# Patient Record
Sex: FEMALE | Race: WHITE | NOT HISPANIC OR LATINO | Employment: OTHER | URBAN - METROPOLITAN AREA
[De-identification: names, ages, dates, MRNs, and addresses within clinical notes are randomized per-mention and may not be internally consistent; named-entity substitution may affect disease eponyms.]

---

## 2017-03-01 ENCOUNTER — HOSPITAL ENCOUNTER (EMERGENCY)
Facility: HOSPITAL | Age: 62
Discharge: PSYCHIATRIC OR SUBSTANCE USE FACILITY | End: 2017-03-01
Attending: SURGERY
Payer: MEDICARE

## 2017-03-01 ENCOUNTER — HOSPITAL ENCOUNTER (INPATIENT)
Facility: HOSPITAL | Age: 62
LOS: 2 days | Discharge: HOME | DRG: 882 | End: 2017-03-03
Attending: PSYCHIATRY & NEUROLOGY | Admitting: PSYCHIATRY & NEUROLOGY
Payer: MEDICARE

## 2017-03-01 VITALS
HEART RATE: 87 BPM | DIASTOLIC BLOOD PRESSURE: 75 MMHG | RESPIRATION RATE: 20 BRPM | TEMPERATURE: 99 F | SYSTOLIC BLOOD PRESSURE: 150 MMHG | OXYGEN SATURATION: 99 %

## 2017-03-01 DIAGNOSIS — I10 ESSENTIAL HYPERTENSION: ICD-10-CM

## 2017-03-01 DIAGNOSIS — F32.A DEPRESSION WITH SUICIDAL IDEATION: ICD-10-CM

## 2017-03-01 DIAGNOSIS — F32.A DEPRESSION, UNSPECIFIED DEPRESSION TYPE: Primary | ICD-10-CM

## 2017-03-01 DIAGNOSIS — R45.851 DEPRESSION WITH SUICIDAL IDEATION: ICD-10-CM

## 2017-03-01 DIAGNOSIS — M54.50 CHRONIC BILATERAL LOW BACK PAIN WITHOUT SCIATICA: ICD-10-CM

## 2017-03-01 DIAGNOSIS — F43.25 ADJUSTMENT DISORDER WITH MIXED DISTURBANCE OF EMOTIONS AND CONDUCT: Primary | ICD-10-CM

## 2017-03-01 DIAGNOSIS — E78.00 PURE HYPERCHOLESTEROLEMIA: ICD-10-CM

## 2017-03-01 DIAGNOSIS — R45.851 SUICIDAL IDEATION: ICD-10-CM

## 2017-03-01 DIAGNOSIS — G89.29 CHRONIC BILATERAL LOW BACK PAIN WITHOUT SCIATICA: ICD-10-CM

## 2017-03-01 LAB
ALBUMIN SERPL BCP-MCNC: 4.2 G/DL
ALP SERPL-CCNC: 66 U/L
ALT SERPL W/O P-5'-P-CCNC: 13 U/L
AMPHET+METHAMPHET UR QL: NEGATIVE
ANION GAP SERPL CALC-SCNC: 8 MMOL/L
APAP SERPL-MCNC: <3 UG/ML
AST SERPL-CCNC: 16 U/L
BACTERIA #/AREA URNS HPF: NORMAL /HPF
BARBITURATES UR QL SCN>200 NG/ML: NEGATIVE
BASOPHILS # BLD AUTO: 0.03 K/UL
BASOPHILS NFR BLD: 0.4 %
BENZODIAZ UR QL SCN>200 NG/ML: NORMAL
BILIRUB SERPL-MCNC: 0.5 MG/DL
BILIRUB UR QL STRIP: NEGATIVE
BUN SERPL-MCNC: 9 MG/DL
BZE UR QL SCN: NEGATIVE
CALCIUM SERPL-MCNC: 10 MG/DL
CANNABINOIDS UR QL SCN: NEGATIVE
CHLORIDE SERPL-SCNC: 110 MMOL/L
CLARITY UR: CLEAR
CO2 SERPL-SCNC: 29 MMOL/L
COLOR UR: YELLOW
CREAT SERPL-MCNC: 0.8 MG/DL
CREAT UR-MCNC: 206.8 MG/DL
DIFFERENTIAL METHOD: ABNORMAL
EOSINOPHIL # BLD AUTO: 0.2 K/UL
EOSINOPHIL NFR BLD: 2.3 %
ERYTHROCYTE [DISTWIDTH] IN BLOOD BY AUTOMATED COUNT: 14.2 %
EST. GFR  (AFRICAN AMERICAN): >60 ML/MIN/1.73 M^2
EST. GFR  (NON AFRICAN AMERICAN): >60 ML/MIN/1.73 M^2
ETHANOL SERPL-MCNC: <10 MG/DL
GLUCOSE SERPL-MCNC: 100 MG/DL
GLUCOSE UR QL STRIP: NEGATIVE
HCT VFR BLD AUTO: 37.9 %
HGB BLD-MCNC: 12.8 G/DL
HGB UR QL STRIP: ABNORMAL
HYALINE CASTS #/AREA URNS LPF: 0 /LPF
KETONES UR QL STRIP: ABNORMAL
LEUKOCYTE ESTERASE UR QL STRIP: NEGATIVE
LYMPHOCYTES # BLD AUTO: 2.3 K/UL
LYMPHOCYTES NFR BLD: 30.2 %
MCH RBC QN AUTO: 29.8 PG
MCHC RBC AUTO-ENTMCNC: 33.8 %
MCV RBC AUTO: 88 FL
METHADONE UR QL SCN>300 NG/ML: NEGATIVE
MICROSCOPIC COMMENT: NORMAL
MONOCYTES # BLD AUTO: 0.5 K/UL
MONOCYTES NFR BLD: 7 %
NEUTROPHILS # BLD AUTO: 4.6 K/UL
NEUTROPHILS NFR BLD: 60.1 %
NITRITE UR QL STRIP: NEGATIVE
OPIATES UR QL SCN: NEGATIVE
PCP UR QL SCN>25 NG/ML: NEGATIVE
PH UR STRIP: >8 [PH] (ref 5–8)
PLATELET # BLD AUTO: 197 K/UL
PMV BLD AUTO: 8.5 FL
POTASSIUM SERPL-SCNC: 3.9 MMOL/L
PROT SERPL-MCNC: 7.3 G/DL
PROT UR QL STRIP: ABNORMAL
RBC # BLD AUTO: 4.3 M/UL
RBC #/AREA URNS HPF: 1 /HPF (ref 0–4)
SALICYLATES SERPL-MCNC: <5 MG/DL
SODIUM SERPL-SCNC: 147 MMOL/L
SP GR UR STRIP: 1.02 (ref 1–1.03)
T4 FREE SERPL-MCNC: 0.92 NG/DL
TOXICOLOGY INFORMATION: NORMAL
TSH SERPL DL<=0.005 MIU/L-ACNC: 1.93 UIU/ML
URN SPEC COLLECT METH UR: ABNORMAL
UROBILINOGEN UR STRIP-ACNC: ABNORMAL EU/DL
WBC # BLD AUTO: 7.71 K/UL
WBC #/AREA URNS HPF: 1 /HPF (ref 0–5)

## 2017-03-01 PROCEDURE — 86592 SYPHILIS TEST NON-TREP QUAL: CPT

## 2017-03-01 PROCEDURE — 11400000 HC PSYCH PRIVATE ROOM

## 2017-03-01 PROCEDURE — 36415 COLL VENOUS BLD VENIPUNCTURE: CPT

## 2017-03-01 PROCEDURE — 96372 THER/PROPH/DIAG INJ SC/IM: CPT

## 2017-03-01 PROCEDURE — 84481 FREE ASSAY (FT-3): CPT

## 2017-03-01 PROCEDURE — 84443 ASSAY THYROID STIM HORMONE: CPT

## 2017-03-01 PROCEDURE — 81000 URINALYSIS NONAUTO W/SCOPE: CPT

## 2017-03-01 PROCEDURE — 63600175 PHARM REV CODE 636 W HCPCS: Performed by: SURGERY

## 2017-03-01 PROCEDURE — 80053 COMPREHEN METABOLIC PANEL: CPT

## 2017-03-01 PROCEDURE — 82306 VITAMIN D 25 HYDROXY: CPT

## 2017-03-01 PROCEDURE — 85025 COMPLETE CBC W/AUTO DIFF WBC: CPT

## 2017-03-01 PROCEDURE — 99285 EMERGENCY DEPT VISIT HI MDM: CPT | Mod: 25

## 2017-03-01 PROCEDURE — 84439 ASSAY OF FREE THYROXINE: CPT

## 2017-03-01 PROCEDURE — 80320 DRUG SCREEN QUANTALCOHOLS: CPT

## 2017-03-01 PROCEDURE — 80329 ANALGESICS NON-OPIOID 1 OR 2: CPT

## 2017-03-01 PROCEDURE — 80061 LIPID PANEL: CPT

## 2017-03-01 PROCEDURE — 82746 ASSAY OF FOLIC ACID SERUM: CPT

## 2017-03-01 PROCEDURE — 80307 DRUG TEST PRSMV CHEM ANLYZR: CPT | Mod: 59

## 2017-03-01 PROCEDURE — 25000003 PHARM REV CODE 250: Performed by: PSYCHIATRY & NEUROLOGY

## 2017-03-01 PROCEDURE — 82607 VITAMIN B-12: CPT

## 2017-03-01 PROCEDURE — 27000339 *HC DAILY SUPPLY KIT

## 2017-03-01 PROCEDURE — 82570 ASSAY OF URINE CREATININE: CPT

## 2017-03-01 RX ORDER — IBUPROFEN 200 MG
1 TABLET ORAL DAILY PRN
Status: DISCONTINUED | OUTPATIENT
Start: 2017-03-01 | End: 2017-03-03 | Stop reason: HOSPADM

## 2017-03-01 RX ORDER — OLANZAPINE 10 MG/1
10 TABLET ORAL EVERY 8 HOURS PRN
Status: DISCONTINUED | OUTPATIENT
Start: 2017-03-01 | End: 2017-03-03 | Stop reason: HOSPADM

## 2017-03-01 RX ORDER — LOPERAMIDE HYDROCHLORIDE 2 MG/1
2 CAPSULE ORAL
Status: DISCONTINUED | OUTPATIENT
Start: 2017-03-01 | End: 2017-03-03 | Stop reason: HOSPADM

## 2017-03-01 RX ORDER — MAG HYDROX/ALUMINUM HYD/SIMETH 200-200-20
30 SUSPENSION, ORAL (FINAL DOSE FORM) ORAL EVERY 6 HOURS PRN
Status: DISCONTINUED | OUTPATIENT
Start: 2017-03-01 | End: 2017-03-03 | Stop reason: HOSPADM

## 2017-03-01 RX ORDER — DOCUSATE SODIUM 100 MG/1
100 CAPSULE, LIQUID FILLED ORAL DAILY PRN
Status: DISCONTINUED | OUTPATIENT
Start: 2017-03-01 | End: 2017-03-03 | Stop reason: HOSPADM

## 2017-03-01 RX ORDER — OLANZAPINE 10 MG/2ML
10 INJECTION, POWDER, FOR SOLUTION INTRAMUSCULAR EVERY 8 HOURS PRN
Status: DISCONTINUED | OUTPATIENT
Start: 2017-03-01 | End: 2017-03-03 | Stop reason: HOSPADM

## 2017-03-01 RX ORDER — LORAZEPAM 2 MG/ML
2 INJECTION INTRAMUSCULAR EVERY 4 HOURS PRN
Status: DISCONTINUED | OUTPATIENT
Start: 2017-03-01 | End: 2017-03-01 | Stop reason: HOSPADM

## 2017-03-01 RX ORDER — DIPHENHYDRAMINE HYDROCHLORIDE 50 MG/ML
50 INJECTION INTRAMUSCULAR; INTRAVENOUS EVERY 4 HOURS PRN
Status: DISCONTINUED | OUTPATIENT
Start: 2017-03-01 | End: 2017-03-01 | Stop reason: HOSPADM

## 2017-03-01 RX ORDER — HYDROXYZINE PAMOATE 50 MG/1
50 CAPSULE ORAL EVERY 6 HOURS PRN
Status: DISCONTINUED | OUTPATIENT
Start: 2017-03-01 | End: 2017-03-03 | Stop reason: HOSPADM

## 2017-03-01 RX ORDER — ACETAMINOPHEN 325 MG/1
650 TABLET ORAL EVERY 6 HOURS PRN
Status: DISCONTINUED | OUTPATIENT
Start: 2017-03-01 | End: 2017-03-03 | Stop reason: HOSPADM

## 2017-03-01 RX ORDER — HALOPERIDOL 5 MG/ML
5 INJECTION INTRAMUSCULAR EVERY 4 HOURS PRN
Status: DISCONTINUED | OUTPATIENT
Start: 2017-03-01 | End: 2017-03-01 | Stop reason: HOSPADM

## 2017-03-01 RX ADMIN — DIPHENHYDRAMINE HYDROCHLORIDE 50 MG: 50 INJECTION, SOLUTION INTRAMUSCULAR; INTRAVENOUS at 02:03

## 2017-03-01 RX ADMIN — OLANZAPINE 10 MG: 10 INJECTION, POWDER, FOR SOLUTION INTRAMUSCULAR at 04:03

## 2017-03-01 RX ADMIN — LORAZEPAM 2 MG: 2 INJECTION, SOLUTION INTRAMUSCULAR; INTRAVENOUS at 02:03

## 2017-03-01 NOTE — ED NOTES
Pt reports she is going smoke. Pt instructed this is a non smoking hospital and she can not smoke. Pt offered something for her nerves to help her relax.

## 2017-03-01 NOTE — PSYCH
Pt accepted for admission by Dr Jade.Report received from Shailesh HAJI.Pt arrived on unit at 1525.Pt,from the beginning was angry, defiant,aggressive.Pt loud,cursing,pulling on exit doors,struck the glass window at nursing station.Unable to redirect pt.Zyprexa 10mg given im.Unable to start admission process.

## 2017-03-01 NOTE — ED PROVIDER NOTES
[facility]                                        March 1, 2017                   Chief Complaint  61 y.o. female with Psychiatric Evaluation    History of Present Illness  Arcelia Earl presents to the emergency room with suicidal ideation per police  Patient was placed under arrest in [place] this weekend, threatened suicide  Patient attempted to use a blanket to hang herself, voicing displeasure with life  Has no obvious psychiatric history, please release the patient psychiatric help  The patient has obvious stressors, her live in son is a methamphetamine addict  Patient also has issues with her sister, angry and frustrated over recent events    The history is provided by the patient  Past medical history: Hypertension  Past surgical history: Back surgery  No Known Allergies     Review of Systems and Physical Exam     Review of Systems  -- Constitution - no fever, denies fatigue, no weakness, no chills  -- Eyes - no tearing or redness, no visual disturbance  -- Ear, Nose - no tinnitus or earache, no nasal congestion or discharge  -- Mouth,Throat - no sore throat, no toothache, normal voice, normal swallowing  -- Respiratory - denies cough and congestion, no shortness of breath, no VILLAGRAN  -- Cardiovascular - denies chest pain, no palpitations, denies claudication  -- Gastrointestinal - denies abdominal pain, nausea, vomiting, or diarrhea  -- Musculoskeletal - denies back pain, negative for myalgias and arthralgias   -- Neurological - no headache, denies weakness or seizure; no LOC  -- Psychiatric - suicidal ideation, depression, stress    Vital Signs  -- Her blood pressure is 150/75 (abnormal) and her pulse is 87.  -- Her respiration is 20 and oxygen saturation is 99%.      Physical Exam  -- Nursing note and vitals reviewed  -- Constitutional: Appears well-developed and well-nourished  -- Head: Atraumatic. Normocephalic. No obvious abnormality  -- Eyes: Pupils are equal and reactive to  light. Normal conjunctiva and lids  -- Cardiac: Normal rate, regular rhythm and normal heart sounds  -- Pulmonary: Normal respiratory effort, breath sounds clear to auscultation  -- Abdominal: Soft, no tenderness. Normal bowel sounds. Normal liver edge  -- Musculoskeletal: Normal range of motion, no effusions. Joints stable   -- Neurological: No focal deficits. Showed good interaction with staff    Emergency Room Course     Diagnosis  -- The primary encounter diagnosis was Depression, unspecified depression type.   -- A diagnosis of Suicidal ideation was also pertinent to this visit.    Disposition and Plan  -- Disposition: PEC  -- Condition: stable  -- Pt will be placed in a psychiatric facility  -- The patient is a direct observation until placement  -- The patient has been made aware of his or her rights while under PEC in the ER  -- All questions have been answered; will follow ER protocols until placement    Lab work was performed in the ER, this patient is cleared for psychiatric placement     This note is dictated on Dragon Natural Speaking word recognition program.  There are word recognition mistakes that are occasionally missed on review.           Shailesh Antonio MD  03/01/17 5164

## 2017-03-01 NOTE — ED NOTES
Pt reports she does not have to urinate at this time. Pt instructed a urine sample is needed for medical clearance.

## 2017-03-01 NOTE — IP AVS SNAPSHOT
[address] [address]  Phone: [phone]           Patient Discharge Instructions     Our goal is to set you up for success. This packet includes information on your condition, medications, and your home care. It will help you to care for yourself so you don't get sicker and need to go back to the hospital.     Please ask your nurse if you have any questions.        There are many details to remember when preparing to leave the hospital. Here is what you will need to do:    1. Take your medicine. If you are prescribed medications, review your Medication List in the following pages. You may have new medications to  at the pharmacy and others that you'll need to stop taking. Review the instructions for how and when to take your medications. Talk with your doctor or nurses if you are unsure of what to do.     2. Go to your follow-up appointments. Specific follow-up information is listed in the following pages. Your may be contacted by a transition nurse or clinical provider about future appointments. Be sure we have all of the phone numbers to reach you, if needed. Please contact your provider's office if you are unable to make an appointment.     3. Watch for warning signs. Your doctor or nurse will give you detailed warning signs to watch for and when to call for assistance. These instructions may also include educational information about your condition. If you experience any of warning signs to your health, call your doctor.               [facility]  Unless otherwise directed by your provider, please contact [facility], our nurse care line that is available for 24/7 assistance.     [phone] (toll-free)    Registered nurses in the [facility] provide clinical advisement, health education, appointment booking, and other advisory services.                    ** Verify the list of medication(s) below is accurate and up to date. Carry  this with you in case of emergency. If your medications have changed, please notify your healthcare provider.             Medication List      CHANGE how you take these medications        Additional Info                      venlafaxine 150 MG Cp24   Commonly known as:  EFFEXOR-XR   Quantity:  30 capsule   Refills:  1   Dose:  150 mg   What changed:  how much to take    Last time this was given:  150 mg on 3/3/2017  8:09 AM   Instructions:  Take 1 capsule (150 mg total) by mouth once daily.     Begin Date    AM    Noon    PM    Bedtime         CONTINUE taking these medications        Additional Info                      clonazePAM 1 MG tablet   Commonly known as:  KLONOPIN   Refills:  0   Dose:  1 mg    Instructions:  Take 1 mg by mouth 2 (two) times daily.     Begin Date    AM    Noon    PM    Bedtime       hydrocodone-acetaminophen 10-325mg  mg Tab   Commonly known as:  NORCO   Refills:  0    Last time this was given:  1 tablet on 3/3/2017  8:09 AM   Instructions:  Take by mouth every 8 (eight) hours as needed for Pain.     Begin Date    AM    Noon    PM    Bedtime       losartan-hydrochlorothiazide 50-12.5 mg 50-12.5 mg per tablet   Commonly known as:  HYZAAR   Refills:  0   Dose:  1 tablet    Instructions:  Take 1 tablet by mouth once daily.     Begin Date    AM    Noon    PM    Bedtime       metoprolol succinate 50 MG 24 hr tablet   Commonly known as:  TOPROL-XL   Refills:  0   Dose:  50 mg    Instructions:  Take 50 mg by mouth once daily.     Begin Date    AM    Noon    PM    Bedtime       pravastatin 20 MG tablet   Commonly known as:  PRAVACHOL   Refills:  0   Dose:  20 mg    Instructions:  Take 20 mg by mouth every evening.     Begin Date    AM    Noon    PM    Bedtime       trazodone 50 MG tablet   Commonly known as:  DESYREL   Refills:  0   Dose:  50 mg    Instructions:  Take 50 mg by mouth every evening.     Begin Date    AM    Noon    PM    Bedtime            Where to Get Your Medications      You  "can get these medications from any pharmacy     Bring a paper prescription for each of these medications     venlafaxine 150 MG Cp24                  Please bring to all follow up appointments:    1. A copy of your discharge instructions.  2. All medicines you are currently taking in their original bottles.  3. Identification and insurance card.    Please arrive 15 minutes ahead of scheduled appointment time.    Please call 24 hours in advance if you must reschedule your appointment and/or time.        Follow-up Information     Follow up with [facility]. Go on 3/16/2017.    Specialties:  Psychology, Psychiatry, Behavioral Health    Why:  Outpatient Psych Services, as scheduled at 8a.m.    Contact information:    [address] [facility] [address] [phone]            Discharge Instructions       Patient educated on medication changes and discharge plan with patient verbalized understanding. Patient agrees to comply to discharge plan. Patient encouraged to follow up with primary care provider. No signs of distress or complaints. Consent obtain to fax information for follow-up visit.        Primary Diagnosis     Your primary diagnosis was:  Adjustment Reaction      Admission Information     Date & Time Provider Department CSN    3/1/2017  3:32 PM Albin Jade MD [facility] [id number]       Admisson Diagnosis: Depression with suicidal ideation      Care Providers     Provider Role Specialty Primary office phone    Albin Jade MD Attending Provider Psychiatry [phone]      Your Vitals Were     BP Pulse Temp Resp Height Weight    126/66 (BP Location: Left arm, Patient Position: Sitting, BP Method: Automatic) 91 97.1 °F (36.2 °C) (Oral) 18 5' 4" (1.626 m) 63.5 kg (140 lb)    BMI                24.03 kg/m2          Recent Lab Values     No lab values to display.      Blood Glucose and Lipid Panel Labs        3/1/2017                           1:46 PM        "    Cholesterol 197           Triglycerides 223 (H)           HDL Cholesterol 37 (L)           LDL Cholesterol 115.4           HDL/Cholesterol Ratio 18.8 (L)           Total Cholesterol/HDL Ratio 5.3 (H)           Non-HDL Cholesterol 160           Comment for Cholesterol at  1:46 PM on 3/1/2017:  The National Cholesterol Education Program (NCEP) has set the  following guidelines (reference ranges) for Cholesterol:  Optimal.....................<200 mg/dL  Borderline High.............200-239 mg/dL  High........................> or = 240 mg/dL      Comment for Triglycerides at  1:46 PM on 3/1/2017:  The National Cholesterol Education Program (NCEP) has set the  following guidelines (reference values) for triglycerides:  Normal......................<150 mg/dL  Borderline High.............150-199 mg/dL  High........................200-499 mg/dL      Comment for HDL Cholesterol at  1:46 PM on 3/1/2017:  The National Cholesterol Education Program (NCEP) has set the  following guidelines (reference values) for HDL Cholesterol:  Low...............<40 mg/dL  Optimal...........>60 mg/dL      Comment for LDL Cholesterol at  1:46 PM on 3/1/2017:  The National Cholesterol Education Program (NCEP) has set the  following guidelines (reference values) for LDL Cholesterol:  Optimal.......................<130 mg/dL  Borderline High...............130-159 mg/dL  High..........................160-189 mg/dL  Very High.....................>190 mg/dL      Comment for Non-HDL Cholesterol at  1:46 PM on 3/1/2017:  Risk category and Non-HDL cholesterol goals:  Coronary heart disease (CHD)or equivalent (10-year risk of CHD >20%):  Non-HDL cholesterol goal     <130 mg/dL  Two or more CHD risk factors and 10-year risk of CHD <= 20%:  Non-HDL cholesterol goal     <160 mg/dL  0 to 1 CHD risk factor:  Non-HDL cholesterol goal     <190 mg/dL        Allergies as of 3/3/2017     No Known Allergies      Advance Directives     An advance directive is a  document which, in the event you are no longer able to make decisions for yourself, tells your healthcare team what kind of treatment you do or do not want to receive, or who you would like to make those decisions for you.  If you do not currently have an advance directive, [facility] encourages you to create one.  For more information call:  [phone]), [phone] ([phone]),  or log on to [email or web address].        Advance Directive Status          Most Recent Value    Advance Directive Status  Patient does not have Advance Directive, declines information.      Smoking Cessation     If you would like to quit smoking:   You may be eligible for free services if you are a [place] resident and started smoking cigarettes before September 1, 1988.  Call the Smoking Cessation Trust (SCT) toll free at (616) 773-4248 or (319) 443-6604.   Call 4-269-QUIT-NOW if you do not meet the above criteria.            Language Assistance Services     ATTENTION: Language assistance services are available, free of charge. Please call [phone].      ATENCIÓN: Si habla español, tiene a badillo disposición servicios gratuitos de asistencia lingüística. Llame al [phone].     CHÚ Ý: N?u b?n nói Ti?ng Vi?t, có các d?ch v? h? tr? ngôn ng? mi?n phí dành cho b?n. G?i s? [phone].        National Suicide Prevention Lifeline     If you or someone you know is thinking about suicide, call the National Suicide Prevention Lifeline.  National Suicide Hotline: 8-161-931-TALK (7529)  The lifeline is free, confidential and always available.  Help a loved one, a friend or yourself.  www.suicideprevenetionlifeline.org          [facility] Sign-Up     Activating your [facility] account is as easy as 1-2-3!     1) Visit [email or web address], select Sign Up Now, enter this activation code and your date of birth, then select Next.  [email or web address]  Expires: 4/17/2017  8:27 AM      2) Create a username and password to use  when you visit [facility] in the future and select a security question in case you lose your password and select Next.    3) Enter your e-mail address and click Sign Up!    Additional Information  If you have questions, please e-mail [email or web address] or call [phone] to talk to our [facility] staff. Remember, [facility] is NOT to be used for urgent needs. For medical emergencies, dial 911.          [facility] complies with applicable Federal civil rights laws and does not discriminate on the basis of race, color, national origin, age, disability, or sex.

## 2017-03-01 NOTE — ED TRIAGE NOTES
Pt brought in by [place] police. Pt reports she was detained last night after verbal altercation with another person at gas station. [place] [facility] reports pt was standing on sink/toliet with a blanket. Police reports pt may having been trying to make noose to hurt self. Pt reports she was trying to cover up camera to not be watched by police. Pt denies SI currently.

## 2017-03-02 LAB
25(OH)D3+25(OH)D2 SERPL-MCNC: 26 NG/ML
CHOLEST/HDLC SERPL: 5.3 {RATIO}
FOLATE SERPL-MCNC: 13.3 NG/ML
HDL/CHOLESTEROL RATIO: 18.8 %
HDLC SERPL-MCNC: 197 MG/DL
HDLC SERPL-MCNC: 37 MG/DL
LDLC SERPL CALC-MCNC: 115.4 MG/DL
NONHDLC SERPL-MCNC: 160 MG/DL
T3FREE SERPL-MCNC: 3.1 PG/ML
TRIGL SERPL-MCNC: 223 MG/DL
VIT B12 SERPL-MCNC: 334 PG/ML

## 2017-03-02 PROCEDURE — 27000339 *HC DAILY SUPPLY KIT

## 2017-03-02 PROCEDURE — 90833 PSYTX W PT W E/M 30 MIN: CPT | Mod: S$PBB,,, | Performed by: PSYCHIATRY & NEUROLOGY

## 2017-03-02 PROCEDURE — 25000003 PHARM REV CODE 250: Performed by: PSYCHIATRY & NEUROLOGY

## 2017-03-02 PROCEDURE — 99223 1ST HOSP IP/OBS HIGH 75: CPT | Mod: AI,S$PBB,, | Performed by: PSYCHIATRY & NEUROLOGY

## 2017-03-02 PROCEDURE — 11400000 HC PSYCH PRIVATE ROOM

## 2017-03-02 PROCEDURE — 36415 COLL VENOUS BLD VENIPUNCTURE: CPT

## 2017-03-02 PROCEDURE — 25000003 PHARM REV CODE 250: Performed by: NURSE PRACTITIONER

## 2017-03-02 RX ORDER — OLANZAPINE 10 MG/2ML
10 INJECTION, POWDER, FOR SOLUTION INTRAMUSCULAR ONCE AS NEEDED
Status: DISCONTINUED | OUTPATIENT
Start: 2017-03-02 | End: 2017-03-02

## 2017-03-02 RX ORDER — VENLAFAXINE HYDROCHLORIDE 150 MG/1
75 CAPSULE, EXTENDED RELEASE ORAL DAILY
Status: ON HOLD | COMMUNITY
End: 2017-03-03

## 2017-03-02 RX ORDER — METOPROLOL SUCCINATE 50 MG/1
50 TABLET, EXTENDED RELEASE ORAL DAILY
COMMUNITY

## 2017-03-02 RX ORDER — HYDROCODONE BITARTRATE AND ACETAMINOPHEN 10; 325 MG/1; MG/1
1 TABLET ORAL EVERY 6 HOURS PRN
Status: DISCONTINUED | OUTPATIENT
Start: 2017-03-02 | End: 2017-03-03 | Stop reason: HOSPADM

## 2017-03-02 RX ORDER — PRAVASTATIN SODIUM 20 MG/1
20 TABLET ORAL NIGHTLY
COMMUNITY

## 2017-03-02 RX ORDER — HYDROXYZINE PAMOATE 50 MG/1
50 CAPSULE ORAL EVERY 6 HOURS PRN
Status: DISCONTINUED | OUTPATIENT
Start: 2017-03-02 | End: 2017-03-02

## 2017-03-02 RX ORDER — CLONAZEPAM 1 MG/1
1 TABLET ORAL 2 TIMES DAILY
COMMUNITY

## 2017-03-02 RX ORDER — TRAZODONE HYDROCHLORIDE 50 MG/1
50 TABLET ORAL NIGHTLY
COMMUNITY

## 2017-03-02 RX ORDER — HYDROCODONE BITARTRATE AND ACETAMINOPHEN 10; 325 MG/1; MG/1
TABLET ORAL EVERY 8 HOURS PRN
COMMUNITY

## 2017-03-02 RX ORDER — LOSARTAN POTASSIUM AND HYDROCHLOROTHIAZIDE 12.5; 5 MG/1; MG/1
1 TABLET ORAL DAILY
COMMUNITY

## 2017-03-02 RX ORDER — VENLAFAXINE HYDROCHLORIDE 150 MG/1
150 CAPSULE, EXTENDED RELEASE ORAL DAILY
Status: DISCONTINUED | OUTPATIENT
Start: 2017-03-02 | End: 2017-03-03 | Stop reason: HOSPADM

## 2017-03-02 RX ADMIN — HYDROXYZINE PAMOATE 50 MG: 50 CAPSULE ORAL at 09:03

## 2017-03-02 RX ADMIN — ACETAMINOPHEN 650 MG: 325 TABLET, FILM COATED ORAL at 09:03

## 2017-03-02 RX ADMIN — HYDROCODONE BITARTRATE AND ACETAMINOPHEN 1 TABLET: 10; 325 TABLET ORAL at 01:03

## 2017-03-02 RX ADMIN — VENLAFAXINE HYDROCHLORIDE 150 MG: 150 CAPSULE, EXTENDED RELEASE ORAL at 12:03

## 2017-03-02 RX ADMIN — HYDROCODONE BITARTRATE AND ACETAMINOPHEN 1 TABLET: 10; 325 TABLET ORAL at 09:03

## 2017-03-02 RX ADMIN — OLANZAPINE 10 MG: 10 INJECTION, POWDER, FOR SOLUTION INTRAMUSCULAR at 11:03

## 2017-03-02 NOTE — PSYCH
"Patient angry because she cannot smoke, and wants norco for pain. Patient refusing tylenol and nicotine patch. Patient reports she is "allergic" to the nicotine patch. Patient at end of goetz banging feet against window and cursing/threatening staff. Staff escorted patient to patient room, patient refusing zyprexa. IM zyprexa given after patient continues to be verbally abusive. Will continue to monitor.  "

## 2017-03-02 NOTE — PLAN OF CARE
Problem: Overarching Goals (Adult)  Goal: Develops/Participates in Therapeutic [place] to Support Successful Transition  Outcome: Ongoing (interventions implemented as appropriate)  Group Note     Behavior:  Patient attended Psychotherapy Group and participated. Patient present with irritable mood and affect, but improved during the session. Sometimes tearful when talking about her son.      Intervention:  Distress Tolerance -   Provided information on distress tolerance, discussed how we make a crisis worse, and reviewed skills and strategies to use in difficult situations. Patients discussed previous stressful times and how they found a way to manage reactions and feelings.       Response:  Patient complained about dealing with her son, in [facility] and when he got out. Discussed changes within and without our control, allowing others to make their own choices and setting boundaries while making good choices for ourselves. Patient noted she did not use positive self talk but would try.       Plan:  Will continue to encourage patient participation in group. Will meet 1:1 with patient later.

## 2017-03-02 NOTE — SUBJECTIVE & OBJECTIVE
Past Medical History:   Diagnosis Date    Hepatitis C     Hypertension        Past Surgical History:   Procedure Laterality Date    BACK SURGERY         Review of patient's allergies indicates:  No Known Allergies    Current Facility-Administered Medications on File Prior to Encounter   Medication    [DISCONTINUED] diphenhydrAMINE injection 50 mg    [DISCONTINUED] haloperidol lactate injection 5 mg    [DISCONTINUED] lorazepam injection 2 mg     No current outpatient prescriptions on file prior to encounter.     Family History     None        Social History Main Topics    Smoking status: Current Every Day Smoker     Types: Cigarettes    Smokeless tobacco: Not on file    Alcohol use No    Drug use: Not on file    Sexual activity: Not on file     Review of Systems   Constitutional: Negative for chills, diaphoresis, fatigue and fever.   Eyes: Negative for visual disturbance.   Respiratory: Negative for cough, shortness of breath and wheezing.    Cardiovascular: Negative for chest pain.   Gastrointestinal: Negative for abdominal distention, abdominal pain, constipation, diarrhea, nausea and vomiting.   Musculoskeletal: Positive for back pain and gait problem. Negative for arthralgias and myalgias.        Using w/c   Neurological: Negative for headaches.   Psychiatric/Behavioral: Positive for agitation, behavioral problems, decreased concentration and dysphoric mood. Negative for confusion, hallucinations, self-injury, sleep disturbance and suicidal ideas. The patient is nervous/anxious. The patient is not hyperactive.      Objective:     Vital Signs (Most Recent):  Temp: (!) 95.1 °F (35.1 °C) (03/02/17 0837)  Pulse: 85 (03/02/17 0837)  Resp: (!) 22 (03/02/17 0837)  BP: (!) 147/71 (03/02/17 0837) Vital Signs (24h Range):  Temp:  [95.1 °F (35.1 °C)-98.5 °F (36.9 °C)] 95.1 °F (35.1 °C)  Pulse:  [79-88] 85  Resp:  [18-22] 22  SpO2:  [99 %] 99 %  BP: (119-150)/(61-83) 147/71        There is no height or weight on  file to calculate BMI.    Physical Exam   Constitutional: She is oriented to person, place, and time. She appears well-developed and well-nourished.   HENT:   Head: Normocephalic and atraumatic.   Right Ear: External ear normal.   Left Ear: External ear normal.   Nose: Nose normal.   Mouth/Throat: No oropharyngeal exudate.   Cardiovascular: Normal rate, regular rhythm and normal heart sounds.    Pulmonary/Chest: Effort normal and breath sounds normal.   Abdominal: Soft. Bowel sounds are normal. There is no tenderness.   Musculoskeletal: She exhibits tenderness (generalized low back pain and neck pain; good rom). She exhibits no edema.   Neurological: She is alert and oriented to person, place, and time.   Skin: Skin is warm and dry.   Psychiatric: She has a normal mood and affect. Her behavior is normal. Judgment and thought content normal.   Nursing note and vitals reviewed.      Significant Labs:   CBC:   Recent Labs  Lab 03/01/17  1347   WBC 7.71   HGB 12.8   HCT 37.9        CMP:   Recent Labs  Lab 03/01/17  1347   *   K 3.9      CO2 29      BUN 9   CREATININE 0.8   CALCIUM 10.0   PROT 7.3   ALBUMIN 4.2   BILITOT 0.5   ALKPHOS 66   AST 16   ALT 13   ANIONGAP 8   EGFRNONAA >60       Significant Imaging: none

## 2017-03-02 NOTE — CONSULTS
[facility]  Adult Nutrition  Consult Note    SUMMARY     Recommendations    Recommendation/Intervention: Continue regular diet as tolerated encouraging good intake  Goals: adequate po itnake >=75%  Nutrition Goal Status: goal met  Communication of RD Recs: other (comment) (note/careplan)    Continuum of Care Plan    Referral to Outpatient Services: behavioral health clinic    Reason for Assessment    Reason for Assessment: physician consult  Diagnosis: other (see comments) (psyc)  Relevent Medical History: HTN, HepC         General Information Comments: Pt admitted with depression, very aggressive to staff    Nutrition Prescription Ordered    Current Diet Order: Regular  Nutrition Order Comments: adequate       Evaluation of Received Nutrients/Fluid Intake    Energy Calories Required: meeting needs  Protein Required: meeting needs  Fluid Required: meeting needs     Tolerance: tolerating     Nutrition Risk Screen     Nutrition Risk Screen: no indicators present    Nutrition/Diet History     Factors Affecting Nutritional Intake: depression    Labs/Tests/Procedures/Meds       Pertinent Labs Reviewed: reviewed  Pertinent Labs Comments: Triglycerides 223H, HDL 37L, Na 147H  Pertinent Medications Reviewed: reviewed  Pertinent Medications Comments: MVI    Physical Findings    Overall Physical Appearance:  (WDL)  Tubes:  (N/A)  Oral/Mouth Cavity: WDL  Skin: intact    Anthropometrics       Height (inches): 64.02 in  Weight Method: Standard Scale  Weight (kg): 63.5 kg     Ideal Body Weight (IBW), Female: 120.1 lb     % Ideal Body Weight, Female (lb): 116.56 lb  BMI (kg/m2): 24.02  BMI Grade: 18.5-24.9 - normal    Estimated/Assessed Needs    Weight Used For Calorie Calculations: 63.5 kg (139 lb 15.9 oz)   Height (cm): 162.6 cm     Energy Need Method: Kcal/kg     25 kcal/kg (kcal): 1587.5   RMR ([place]-St. Jeor Equation): 1199.34        Weight Used For Protein Calculations: 63.5 kg (139 lb 15.9 oz)  Protein  Requirements: 50-63 (0.8-1.0)  0.8 gm Protein (gm): 50.91 and 1.0 gm Protein (gm): 63.63  Fluid Need Method: RDA Method 1ml/kcal or per MD    Nutrition Diagnosis    No nutritional diagnosis at this time     Monitor and Evaluation    Food and Nutrient Intake: food and beverage intake  Food and Nutrient Adminstration: diet order  Knowledge/Beliefs/Attitudes: food and nutrition knowledge/skill, beliefs and attitudes  Physical Activity and Function: nutrition-related ADLs and IADLs  Anthropometric Measurements: weight, weight change  Biochemical Data, Medical Tests and Procedures: electrolyte and renal panel  Nutrition-Focused Physical Findings: overall appearance    Nutrition Risk    Level of Risk: low    Nutrition Follow-Up    RD Follow-up?: Yes (1x/wk)  03/09/2017    Assessment and Plan    No new Assessment & Plan notes have been filed under this hospital service since the last note was generated.  Service: Nutrition

## 2017-03-02 NOTE — PLAN OF CARE
Problem: Patient Care Overview (Adult)  Goal: Plan of Care Review  Outcome: Ongoing (interventions implemented as appropriate)  Pt is sleeping at this time and has slept 9.5 hours with one awakening. Walked to nurses station requesting to eat.  Had not eaten upon admit due to being medicated.  Slow gait, walks bent over. Food provided.  Pt ate and returned to bed.  No outbursts.   NAD.  Resp even & unlabored.  Pathways clear.  Q 15 minute safety checks ongoing.  All precautions maintained
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
## 2017-03-02 NOTE — PLAN OF CARE
Problem: Patient Care Overview (Adult)  Goal: Plan of Care Review  Outcome: Ongoing (interventions implemented as appropriate)  Patient labile. Demanding to smoke despite education on unit rules. Nicotine patch offered, but patient refused. Patient walking without assistance, but with unsteady gait. Patient refusing for staff to assist with ambulation.  Patient refusing to use wheelchair despite staff encouragement. Safety precautions maintained. Frequent rounding ongoing.  Medication seeking behavior. Patient denies events that led up to hospitalization, blaming other family members. Accepting meals and snacks. Compliant with medications.

## 2017-03-02 NOTE — PSYCH
Patient appears calmer. Lunch tray provided. Patient cooperative. Apologized to multiple staff for previous poor behavior.
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
## 2017-03-02 NOTE — PLAN OF CARE
Problem: Patient Care Overview (Adult)  Goal: Plan of Care Review  Outcome: Ongoing (interventions implemented as appropriate)  Nutrition Recommendation/Intervention:   Continue regular diet as tolerated encouraging good intake     Goals: adequate po itnake >=75%  Nutrition Goal Status: goal met  Communication of RD Recs: other (comment) (note/careplan)     Continuum of Care Plan     Referral to Outpatient Services: behavioral health clinic
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
## 2017-03-03 VITALS
WEIGHT: 140 LBS | TEMPERATURE: 97 F | DIASTOLIC BLOOD PRESSURE: 66 MMHG | RESPIRATION RATE: 18 BRPM | SYSTOLIC BLOOD PRESSURE: 126 MMHG | HEART RATE: 91 BPM | HEIGHT: 64 IN | BODY MASS INDEX: 23.9 KG/M2

## 2017-03-03 PROBLEM — I10 ESSENTIAL HYPERTENSION: Status: ACTIVE | Noted: 2017-03-03

## 2017-03-03 PROBLEM — M54.50 CHRONIC BILATERAL LOW BACK PAIN WITHOUT SCIATICA: Status: ACTIVE | Noted: 2017-03-03

## 2017-03-03 PROBLEM — G89.29 CHRONIC BILATERAL LOW BACK PAIN WITHOUT SCIATICA: Status: ACTIVE | Noted: 2017-03-03

## 2017-03-03 PROBLEM — F32.A DEPRESSION WITH SUICIDAL IDEATION: Status: RESOLVED | Noted: 2017-03-01 | Resolved: 2017-03-03

## 2017-03-03 PROBLEM — R45.851 DEPRESSION WITH SUICIDAL IDEATION: Status: RESOLVED | Noted: 2017-03-01 | Resolved: 2017-03-03

## 2017-03-03 PROBLEM — E78.00 PURE HYPERCHOLESTEROLEMIA: Status: ACTIVE | Noted: 2017-03-03

## 2017-03-03 PROBLEM — F43.25 ADJUSTMENT DISORDER WITH MIXED DISTURBANCE OF EMOTIONS AND CONDUCT: Status: ACTIVE | Noted: 2017-03-03

## 2017-03-03 LAB — RPR SER QL: NORMAL

## 2017-03-03 PROCEDURE — 99239 HOSP IP/OBS DSCHRG MGMT >30: CPT | Mod: S$PBB,,, | Performed by: PSYCHIATRY & NEUROLOGY

## 2017-03-03 PROCEDURE — 99222 1ST HOSP IP/OBS MODERATE 55: CPT | Mod: ,,, | Performed by: NURSE PRACTITIONER

## 2017-03-03 PROCEDURE — 25000003 PHARM REV CODE 250: Performed by: PSYCHIATRY & NEUROLOGY

## 2017-03-03 PROCEDURE — 25000003 PHARM REV CODE 250: Performed by: NURSE PRACTITIONER

## 2017-03-03 RX ORDER — VENLAFAXINE HYDROCHLORIDE 150 MG/1
150 CAPSULE, EXTENDED RELEASE ORAL DAILY
Qty: 30 CAPSULE | Refills: 1 | Status: SHIPPED | OUTPATIENT
Start: 2017-03-03

## 2017-03-03 RX ORDER — LOSARTAN POTASSIUM AND HYDROCHLOROTHIAZIDE 12.5; 5 MG/1; MG/1
1 TABLET ORAL DAILY
Status: DISCONTINUED | OUTPATIENT
Start: 2017-03-03 | End: 2017-03-03 | Stop reason: HOSPADM

## 2017-03-03 RX ORDER — PRAVASTATIN SODIUM 20 MG/1
20 TABLET ORAL NIGHTLY
Status: DISCONTINUED | OUTPATIENT
Start: 2017-03-03 | End: 2017-03-03 | Stop reason: HOSPADM

## 2017-03-03 RX ORDER — METOPROLOL SUCCINATE 25 MG/1
50 TABLET, EXTENDED RELEASE ORAL DAILY
Status: DISCONTINUED | OUTPATIENT
Start: 2017-03-03 | End: 2017-03-03 | Stop reason: HOSPADM

## 2017-03-03 RX ADMIN — VENLAFAXINE HYDROCHLORIDE 150 MG: 150 CAPSULE, EXTENDED RELEASE ORAL at 08:03

## 2017-03-03 RX ADMIN — HYDROCODONE BITARTRATE AND ACETAMINOPHEN 1 TABLET: 10; 325 TABLET ORAL at 08:03

## 2017-03-03 RX ADMIN — THERA TABS 1 TABLET: TAB at 08:03

## 2017-03-03 NOTE — PSYCH
Patient will be following up with [facility] at [address] [place] [address] in [place], [phone]  Appointment is on 3/16 at 8 am.  Patient will receive a tobacco cessation therapy appointment at the previously mentioned appointment.  AVS and discharge summary was faxed at 11:30 am on 3/3/2017.

## 2017-03-03 NOTE — PLAN OF CARE
Problem: Patient Care Overview (Adult)  Goal: Plan of Care Review  Outcome: Ongoing (interventions implemented as appropriate)  Pt is sleeping at this time and has slept 6.5 hours uninterupted.  Stayed up in room reading for awhile before falling asleep.  NAD.  Resp even & unlabored.  Pathways clear.  Q 15 minute safety checks ongoing.  All precautions maintained

## 2017-03-03 NOTE — PSYCH
Patient is medically cleared for discharge. Patient is not a danger to self/others, or is not gravely disabled. Patient compliant and cooperative with discharge education. No suicidal/homicidal thoughts or negative emotions at this time. Pain score 0/10. No concerns or questions. Patient does not have transportation. [facility] transportation called, approved by unit director.

## 2017-03-03 NOTE — CONSULTS
[facility] Hospital Medicine  Consult Note    Patient Name: Arcelia Earl  MRN: [id number]  Admission Date: 3/1/2017  Hospital Length of Stay: 2 days  Attending Physician: Albin Jade MD   Primary Care Provider: Primary Doctor No           Patient information was obtained from patient and ER records.     Consults  Subjective:     Principal Problem: Adjustment disorder with mixed disturbance of emotions and conduct    Chief Complaint: No chief complaint on file.       HPI: 60 y/o, Arcelia Earl, presents to the emergency room with suicidal ideation per police. Patient was placed under arrest in [place] this weekend, threatened suicide.   Patient attempted to use a blanket to hang herself, voicing displeasure with life.       Past Medical History:   Diagnosis Date    Hepatitis C     Hypertension        Past Surgical History:   Procedure Laterality Date    BACK SURGERY         Review of patient's allergies indicates:  No Known Allergies    Current Facility-Administered Medications on File Prior to Encounter   Medication    [DISCONTINUED] diphenhydrAMINE injection 50 mg    [DISCONTINUED] haloperidol lactate injection 5 mg    [DISCONTINUED] lorazepam injection 2 mg     No current outpatient prescriptions on file prior to encounter.     Family History     None        Social History Main Topics    Smoking status: Current Every Day Smoker     Types: Cigarettes    Smokeless tobacco: Not on file    Alcohol use No    Drug use: Not on file    Sexual activity: Not on file     Review of Systems   Constitutional: Negative for chills, diaphoresis, fatigue and fever.   Eyes: Negative for visual disturbance.   Respiratory: Negative for cough, shortness of breath and wheezing.    Cardiovascular: Negative for chest pain.   Gastrointestinal: Negative for abdominal distention, abdominal pain, constipation, diarrhea, nausea and vomiting.   Musculoskeletal: Positive for back pain and gait  problem. Negative for arthralgias and myalgias.        Using w/c   Neurological: Negative for headaches.   Psychiatric/Behavioral: Positive for agitation, behavioral problems, decreased concentration and dysphoric mood. Negative for confusion, hallucinations, self-injury, sleep disturbance and suicidal ideas. The patient is nervous/anxious. The patient is not hyperactive.      Objective:     Vital Signs (Most Recent):  Temp: (!) 95.1 °F (35.1 °C) (03/02/17 0837)  Pulse: 85 (03/02/17 0837)  Resp: (!) 22 (03/02/17 0837)  BP: (!) 147/71 (03/02/17 0837) Vital Signs (24h Range):  Temp:  [95.1 °F (35.1 °C)-98.5 °F (36.9 °C)] 95.1 °F (35.1 °C)  Pulse:  [79-88] 85  Resp:  [18-22] 22  SpO2:  [99 %] 99 %  BP: (119-150)/(61-83) 147/71        There is no height or weight on file to calculate BMI.    Physical Exam   Constitutional: She is oriented to person, place, and time. She appears well-developed and well-nourished.   HENT:   Head: Normocephalic and atraumatic.   Right Ear: External ear normal.   Left Ear: External ear normal.   Nose: Nose normal.   Mouth/Throat: No oropharyngeal exudate.   Cardiovascular: Normal rate, regular rhythm and normal heart sounds.    Pulmonary/Chest: Effort normal and breath sounds normal.   Abdominal: Soft. Bowel sounds are normal. There is no tenderness.   Musculoskeletal: She exhibits tenderness (generalized low back pain and neck pain; good rom). She exhibits no edema. Several back scars noted.   Neurological: She is alert and oriented to person, place, and time.   Skin: Skin is warm and dry.   Psychiatric: She has a normal mood and affect. Her behavior is normal. Judgment and thought content normal.   Nursing note and vitals reviewed.      Significant Labs:   CBC:   Recent Labs  Lab 03/01/17  1347   WBC 7.71   HGB 12.8   HCT 37.9        CMP:   Recent Labs  Lab 03/01/17  1347   *   K 3.9      CO2 29      BUN 9   CREATININE 0.8   CALCIUM 10.0   PROT 7.3   ALBUMIN 4.2    BILITOT 0.5   ALKPHOS 66   AST 16   ALT 13   ANIONGAP 8   EGFRNONAA >60       Significant Imaging: none    Assessment/Plan:     * Adjustment disorder with mixed disturbance of emotions and conduct  Per psych      Pure hypercholesterolemia  Restart statin and asa      Essential hypertension  Resume home bp meds      Chronic bilateral low back pain without sciatica  Ok to resume home norco dose      VTE Risk Mitigation     None        Thank you for your consult. I will sign off. Please contact us if you have any additional questions.    Mary Aldana NP  Department of Hospital Medicine   [facility]

## 2017-03-03 NOTE — PLAN OF CARE
Problem: Patient Care Overview (Adult)  Goal: Plan of Care Review  Outcome: Ongoing (interventions implemented as appropriate)  Pt calm and cooperative, appetite, vital signs stable, safety precautions maintained, will continue to monitor

## 2017-03-03 NOTE — PSYCH
Awaiting [facility] transportation for discharge transport. Patient requested transportation bring her to [facility] to  vehicle. Patient will return to her residence in [place] after obtaining vehicle.

## 2017-03-03 NOTE — DISCHARGE SUMMARY
Discharge Summary  Psychiatry    Admit Date: 3/1/2017    Discharge Date and Time:  03/03/2017 10:35 AM    Attending Physician: Albin Jade MD     Discharge Provider: Albin Jade MD    Reason for Admission:  depression and SI    History of Present Illness:   The patient presented to the ER via the [place] police on 3/1/17 with complaints of reported SI and depression. She was detained by the police after having a verbal altercation with a family. While in police custody, she reportedly tried to hang herself with a blanket. She was then brought to the hospital.     The patient was medically cleared and admitted to the [facility].      The patient was a inconsistent historian. She denied all psychiatric symptoms as documented below. There is concern that she is minimizing symptoms to secure discharge. She denied any SA, stating the she hung the blanket to get the police officers' attention. She reports a history of depression/anxiety, and states that she has been on effexor for years, but it does not appear to be helping as much- she also admits to being off of it for about 1 week.     She reports significant and chronic psychosocial stressors including estrangement form one son, having significant stress related to the other son's meth addiction, financial stress and chronic medical stressors.      She was very tearful during the interview. She has been very demanding with staff and often yells and slams things if her demands are not met.      Denied Symptoms of Depression: no diminished mood or loss of interest/anhedonia; no irritability, diminished energy, change in sleep, change in appetite, diminished concentration or cognition or indecisiveness, PMA/R, excessive guilt or hopelessness or worthlessness, or suicidal ideations     Denied trouble with Sleep: no trouble with initiation, maintenance, or early morning awakening with inability to return to sleep     Denied Suicidal/Homicidal ideations: no  "active/passive ideations, organized plans, or future intentions; "I have to much to live for, I have to many grandchildren"     Denied Symptoms of psychosis: no hallucinations, delusions, disorganized thinking, disorganized behavior or abnormal motor behavior, or negative symptoms      Denied Symptoms of carlos eduardo or hypomania: no elevated, expansive, or irritable mood with no increased energy or activity; with no inflated self-esteem or grandiosity, decreased need for sleep, increased rate of speech, FOI or racing thoughts, distractibility, increased goal directed activity or PMA, or risky/disinhibited behavior     Denied Symptoms of GEOVANNA: no excessive anxiety/worry/fear with no restlessness, fatigue, poor concentration, irritability, muscle tension, or sleep disturbance      Denied Symptoms of Panic Disorder: no recurrent panic attacks; without agoraphobia     Denied Symptoms of PTSD: no h/o trauma; no re-experiencing/intrusive symptoms, avoidant behavior, negative alterations in cognition or mood, or hyperarousal symptoms; without dissociative symptoms      Denied Symptoms of OCD: no obsessions or compulsions      Denied Symptoms of Eating Disorders: no anorexia, bulimia or binging     Denied Substance Use: denied intoxication, withdrawal, tolerance, used in larger amounts or duration than intended, unsuccessful attempts to limit or quit, increased time engaging in or seeking out, cravings or strong desire to use, failure to fulfill obligations, negative consequences in social/interpersonal/occupational,/recreational areas, use in dangerous situations, or medical/psychological consequences        Procedures Performed: * No surgery found *    Hospital Course (synopsis of major diagnoses, care, treatment, and services provided during the course of the hospital stay):   The patient was stabilized and discharged on the following medications:  Resume home medications as below  Effexor  mg po q day for " "depression/anxiety  Trazodone 50 mg po q HS for adjunct vie depression     The patient was compliant with treatment. The patient denied any side effects.     Denied Symptoms of Depression: no diminished mood or loss of interest/anhedonia; no irritability, diminished energy, change in sleep, change in appetite, diminished concentration or cognition or indecisiveness, PMA/R, excessive guilt or hopelessness or worthlessness, or suicidal ideations     Denied trouble with Sleep: no trouble with initiation, maintenance, or early morning awakening with inability to return to sleep     Denied Suicidal/Homicidal ideations: no active/passive ideations, organized plans, or future intentions; "I have to much to live for, I have to many grandchildren." She was future oriented. Risk factors include age and substance use and history of SA as well as chronic medical issues/pain. Modified SAD PERSONS score was 3- low risk; Protective factors include gender, grandchildren, and lack of current depression or SI.      Denied Symptoms of psychosis: no hallucinations, delusions, disorganized thinking, disorganized behavior or abnormal motor behavior, or negative symptoms      Denied Symptoms of carlos eduardo or hypomania: no elevated, expansive, or irritable mood with no increased energy or activity; with no inflated self-esteem or grandiosity, decreased need for sleep, increased rate of speech, FOI or racing thoughts, distractibility, increased goal directed activity or PMA, or risky/disinhibited behavior    Denied s/s of anxiety.    During her stay, he behavior escalated with hitting walls and yelling in order to obtain narcotics. She does have legitimate pain issues, and all behavior, mood and anxiety symptoms resolved once she obtained narcotics for pain control. All symptoms fully resolved as above. She admitted to "acting up" in halfway to secure attention and medications/relesae as well as demonstrated "acting up" to secure meds while " hospitalized.     Her alleged hanging was a suicidal gesture used for the purposes of manipulation as were her behaviors in the hospital. There is strong suspicion for cluster B Axis II as well as opiate addiction vs pseudo-addiction.    Discussed diagnosis, risks and benefits of proposed treatment vs alternative treatments vs no treatment, and potential side effects of these treatments.  The patient expresses understanding of the above and displays the capacity to agree with this treatment given said understanding.  Patient also agrees that, currently, the benefits outweigh the risks and would like to pursue treatment at this time.    MSE: stated age, casually dressed, well groomed.  No psychomotor agitation or retardation.  No abnormal involuntary movements.  Gait normal.  Speech normal, conversational.  Language fluent English. Mood fine.  Affect normal range, pleasant, euthymic.  Thought process linear.  Associations intact.  Denies suicidal or homicidal ideation.  Denies auditory hallucinations, paranoid ideation, ideas of reference.  Memory intact.  Attention intact.  Fund of knowledge intact.  Insight intact.  Judgment intact.  Alert and oriented to person, place, time.      Tobacco Usage:  Is patient a smoker? Yes  Does patient want prescription for Tobacco Cessation? No  Does patient want counseling for Tobacco Cessation? No    Final Diagnoses:    Principal Problem: Adjustment Disorder with mixed depressed and anxious features; with mixed and disturbed emotions   Secondary Diagnoses:   H/O Anxiety and Depression     Nicotine Dependence    Labs:  Admission on 03/01/2017   Component Date Value Ref Range Status    Cholesterol 03/01/2017 197  120 - 199 mg/dL Final    Triglycerides 03/01/2017 223* 30 - 150 mg/dL Final    HDL 03/01/2017 37* 40 - 75 mg/dL Final    LDL Cholesterol 03/01/2017 115.4  63.0 - 159.0 mg/dL Final    HDL/Chol Ratio 03/01/2017 18.8* 20.0 - 50.0 % Final    Total Cholesterol/HDL Ratio  03/01/2017 5.3* 2.0 - 5.0 Final    Non-HDL Cholesterol 03/01/2017 160  mg/dL Final   Admission on 03/01/2017, Discharged on 03/01/2017   Component Date Value Ref Range Status    Sodium 03/01/2017 147* 136 - 145 mmol/L Final    Potassium 03/01/2017 3.9  3.5 - 5.1 mmol/L Final    Chloride 03/01/2017 110  95 - 110 mmol/L Final    CO2 03/01/2017 29  23 - 29 mmol/L Final    Glucose 03/01/2017 100  70 - 110 mg/dL Final    BUN, Bld 03/01/2017 9  6 - 20 mg/dL Final    Creatinine 03/01/2017 0.8  0.5 - 1.4 mg/dL Final    Calcium 03/01/2017 10.0  8.7 - 10.5 mg/dL Final    Total Protein 03/01/2017 7.3  6.0 - 8.4 g/dL Final    Albumin 03/01/2017 4.2  3.5 - 5.2 g/dL Final    Total Bilirubin 03/01/2017 0.5  0.1 - 1.0 mg/dL Final    Alkaline Phosphatase 03/01/2017 66  55 - 135 U/L Final    AST 03/01/2017 16  10 - 40 U/L Final    ALT 03/01/2017 13  10 - 44 U/L Final    Anion Gap 03/01/2017 8  8 - 16 mmol/L Final    eGFR if African American 03/01/2017 >60  >60 mL/min/1.73 m^2 Final    eGFR if non African American 03/01/2017 >60  >60 mL/min/1.73 m^2 Final    Acetaminophen (Tylenol), Serum 03/01/2017 <3.0* 10.0 - 20.0 ug/mL Final    WBC 03/01/2017 7.71  3.90 - 12.70 K/uL Final    RBC 03/01/2017 4.30  4.00 - 5.40 M/uL Final    Hemoglobin 03/01/2017 12.8  12.0 - 16.0 g/dL Final    Hematocrit 03/01/2017 37.9  37.0 - 48.5 % Final    MCV 03/01/2017 88  82 - 98 fL Final    MCH 03/01/2017 29.8  27.0 - 31.0 pg Final    MCHC 03/01/2017 33.8  32.0 - 36.0 % Final    RDW 03/01/2017 14.2  11.5 - 14.5 % Final    Platelets 03/01/2017 197  150 - 350 K/uL Final    MPV 03/01/2017 8.5* 9.2 - 12.9 fL Final    Gran # 03/01/2017 4.6  1.8 - 7.7 K/uL Final    Lymph # 03/01/2017 2.3  1.0 - 4.8 K/uL Final    Mono # 03/01/2017 0.5  0.3 - 1.0 K/uL Final    Eos # 03/01/2017 0.2  0.0 - 0.5 K/uL Final    Baso # 03/01/2017 0.03  0.00 - 0.20 K/uL Final    Gran% 03/01/2017 60.1  38.0 - 73.0 % Final    Lymph% 03/01/2017 30.2  18.0 -  48.0 % Final    Mono% 03/01/2017 7.0  4.0 - 15.0 % Final    Eosinophil% 03/01/2017 2.3  0.0 - 8.0 % Final    Basophil% 03/01/2017 0.4  0.0 - 1.9 % Final    Differential Method 03/01/2017 Automated   Final    Salicylate Lvl 03/01/2017 <5.0* 15.0 - 30.0 mg/dL Final    Specimen UA 03/01/2017 Urine, Clean Catch   Final    Color, UA 03/01/2017 Yellow  Yellow, Straw, [place] Final    Appearance, UA 03/01/2017 Clear  Clear Final    pH, UA 03/01/2017 >8.0  5.0 - 8.0 Final    Specific Gravity, UA 03/01/2017 1.020  1.005 - 1.030 Final    Protein, UA 03/01/2017 1+* Negative Final    Glucose, UA 03/01/2017 Negative  Negative Final    Ketones, UA 03/01/2017 Trace* Negative Final    Bilirubin (UA) 03/01/2017 Negative  Negative Final    Occult Blood UA 03/01/2017 Trace* Negative Final    Nitrite, UA 03/01/2017 Negative  Negative Final    Urobilinogen, UA 03/01/2017 2.0-3.0* <2.0 EU/dL Final    Leukocytes, UA 03/01/2017 Negative  Negative Final    TSH 03/01/2017 1.929  0.400 - 4.000 uIU/mL Final    Alcohol, Medical, Serum 03/01/2017 <10  <10 mg/dL Final    Benzodiazepines 03/01/2017 Presumptive Positive   Final    Methadone metabolites 03/01/2017 Negative   Final    Cocaine (Metab.) 03/01/2017 Negative   Final    Opiate Scrn, Ur 03/01/2017 Negative   Final    Barbiturate Screen, Ur 03/01/2017 Negative   Final    Amphetamine Screen, Ur 03/01/2017 Negative   Final    THC 03/01/2017 Negative   Final    Phencyclidine 03/01/2017 Negative   Final    Creatinine, Random Ur 03/01/2017 206.8  15.0 - 325.0 mg/dL Final    Toxicology Information 03/01/2017 SEE COMMENT   Final    Vit D, 25-Hydroxy 03/01/2017 26* 30 - 96 ng/mL Final    Folate 03/01/2017 13.3  4.0 - 24.0 ng/mL Final    T3, Free 03/01/2017 3.1  2.3 - 4.2 pg/mL Final    Free T4 03/01/2017 0.92  0.71 - 1.51 ng/dL Final    Vitamin B-12 03/01/2017 334  210 - 950 pg/mL Final    RBC, UA 03/01/2017 1  0 - 4 /hpf Final    WBC, UA 03/01/2017 1  0 - 5 /hpf  Final    Bacteria,  03/01/2017 Occasional  None-Occ /hpf Final    Hyaline Casts,  03/01/2017 0  0-1/lpf /lpf Final    Microscopic Comment 03/01/2017 light mucous   Final         Discharged Condition: stable and improved; not currently a danger to self/others or gravely disabled    Disposition: Home or Self Care    Is patient being discharged on multiple neuroleptics? No    Follow Up/Patient Instructions:     Medications:  Reconciled Home Medications:   Current Discharge Medication List      CONTINUE these medications which have NOT CHANGED    Details   clonazePAM (KLONOPIN) 1 MG tablet Take 1 mg by mouth 2 (two) times daily.      hydrocodone-acetaminophen 10-325mg (NORCO)  mg Tab Take by mouth every 8 (eight) hours as needed for Pain.      losartan-hydrochlorothiazide 50-12.5 mg (HYZAAR) 50-12.5 mg per tablet Take 1 tablet by mouth once daily.      metoprolol succinate (TOPROL-XL) 50 MG 24 hr tablet Take 50 mg by mouth once daily.      pravastatin (PRAVACHOL) 20 MG tablet Take 20 mg by mouth every evening.      trazodone (DESYREL) 50 MG tablet Take 50 mg by mouth every evening.      venlafaxine (EFFEXOR-XR) 150 MG Cp24 Take 75 mg by mouth once daily.           No discharge procedures on file.  Follow-up Information     Follow up with [facility]. Go on 3/16/2017.    Specialties:  Psychology, Psychiatry, Behavioral Health    Why:  Outpatient Psych Services, as scheduled at 8a.m.    Contact information:    [address] [place] [id number] [phone]              Diet: heart healthy diet    Activity as tolerated    Total time spent discharging patient: 32 minutes    Albin Jade MD  Psychiatry

## 2017-03-03 NOTE — PSYCH
"    Chief Complaint:  Patient irritable,impatient. Reports she had an argument at the [employer] about her adult son who lived with her and  is using meth. He is now with his aunt.   Patient walks bent over, states she had back surgeries. After the argument with her son's aunt at the gas station, she was in prison and tried to hang herself.  Patient presents with constricted affect, irritable. "I have to deal with my stress my own way. I stay in my room, close the door and turn off the light. My son told everybody I'd been staying in my room so I was crazy. I don't like to be around people. I don't like people."   States she gets her husbands social security-widows benefit, "Whatever money my son doesn't suck me out of."Patient lives in [place].  Reports a stressful childhood - my mom left me, my dad was a drunk. I ended up with a  at 14, he always took care of me.   States she has had several deaths in her family in the past 5 years. She is estranged from her other son.     Pt Age/Gender/Appearance/Psych History/Symptoms and Duration:  Patient is a 62yo female with depression, SI     Suicidal Ideations/Plan/Attempt History/Risk and Protective Factors:  Patient denies. States she hung the blanket in prison to get attention.   States she has too much to live for, loves her grandchildren.   Patient notes she feels fine right now. "I feel great. I'm away from them, from the drama.    Previous Suicide Attempts: SA x 2; first at age 17 by OD after a break up; once in 2004 by shooting self in the stomach after  left her    Substance Abuse History/UTOX:  Patient has positive UTOX for benzos    Sleep/Appetite Quality:  Reports no problems     Compliance/Legal History/Issues:  Non compliant with meds    Abuse Concerns:  None     Cultural/Yarsanism Values/Beliefs:  Atheist     Supports/Marital Status/Quality of Interpersonal Relationships:  Patient reports none.     Initial Discharge Plan:  D/C to home   FU " [facility]

## 2017-03-03 NOTE — PSYCH
The patient signed a release of information for [facility];therefore, a packet has been faxed. the patient stated that she will attempt to arrange transportation, but she is uncertain that she will be successful. I asked that she communicate with staff if she is unable to get a ride.

## 2018-03-21 ENCOUNTER — TELEPHONE (OUTPATIENT)
Dept: PAIN MEDICINE | Facility: CLINIC | Age: 63
End: 2018-03-21

## 2018-03-21 NOTE — TELEPHONE ENCOUNTER
----- Message from Celestina Stovall sent at 3/21/2018 11:36 AM CDT -----  Contact: PATIENT  Arcelia Earl  MRN: [id number]  : 1955  PCP: Primary Doctor No  Home Phone      [phone]  Work Phone      Not on file.  Mobile          [phone]  Home Phone      [phone]      MESSAGE: Patient would like to make an appointment with Dr. Llamas for chronic neck & back pain.      Phone: [phone]

## 2018-03-21 NOTE — TELEPHONE ENCOUNTER
Pt seeking an appt for back pain. Pt previously saw Dr Tex Mckenna. Pt states she has had injection with no relief. Pt will call back to schedule an appt if interested. Informed pt we must get records from previous MD prior to an appt. Pt voiced understanding.